# Patient Record
Sex: FEMALE | Race: WHITE | NOT HISPANIC OR LATINO
[De-identification: names, ages, dates, MRNs, and addresses within clinical notes are randomized per-mention and may not be internally consistent; named-entity substitution may affect disease eponyms.]

---

## 2020-03-17 ENCOUNTER — APPOINTMENT (OUTPATIENT)
Dept: RHEUMATOLOGY | Facility: CLINIC | Age: 35
End: 2020-03-17

## 2020-03-19 PROBLEM — Z00.00 ENCOUNTER FOR PREVENTIVE HEALTH EXAMINATION: Status: ACTIVE | Noted: 2020-03-19

## 2020-04-10 ENCOUNTER — RX RENEWAL (OUTPATIENT)
Age: 35
End: 2020-04-10

## 2020-04-28 ENCOUNTER — APPOINTMENT (OUTPATIENT)
Dept: RHEUMATOLOGY | Facility: CLINIC | Age: 35
End: 2020-04-28
Payer: COMMERCIAL

## 2020-04-28 DIAGNOSIS — M25.50 PAIN IN UNSPECIFIED JOINT: ICD-10-CM

## 2020-04-28 DIAGNOSIS — L60.8 OTHER NAIL DISORDERS: ICD-10-CM

## 2020-04-28 DIAGNOSIS — Z78.9 OTHER SPECIFIED HEALTH STATUS: ICD-10-CM

## 2020-04-28 PROCEDURE — 99213 OFFICE O/P EST LOW 20 MIN: CPT | Mod: 95

## 2020-04-29 PROBLEM — M25.50 ARTHRALGIA: Status: ACTIVE | Noted: 2020-04-29

## 2020-04-29 PROBLEM — L60.8 PITTING OF NAILS: Status: ACTIVE | Noted: 2020-04-29

## 2020-04-29 PROBLEM — Z78.9 NON-SMOKER: Status: ACTIVE | Noted: 2020-04-29

## 2020-04-29 NOTE — ASSESSMENT
[FreeTextEntry1] : 35 year old woman with history of psoriatic arthritis, doing well on enbrel 50 mg.  Patient currently taking enbrel about every three to four weeks.  Patient feeling well.  Reviewed side effects of enbrel with patient, including but not limited to increase in risk of infections including tuberculosis, malignancy, demyelinating diseases, and worsening of heart failure symptoms.  Reviewed hand washing, avoiding sick contacts, social distancing, and holding enbrel if feeling unwell.

## 2020-04-29 NOTE — DATA REVIEWED
[FreeTextEntry1] : Labs:\par Patient reviewed on her phone portal from previous office\par July 2019:\par Quantiferon negative

## 2020-04-29 NOTE — HISTORY OF PRESENT ILLNESS
[Home] : at home, [unfilled] , at the time of the visit. [Other Location: e.g. Home (Enter Location, City,State)___] : at [unfilled] [Patient] : the patient [Self] : self [FreeTextEntry1] : 35 year old woman with history of psoriatic arthritis, seen via video visit for follow up care.  Patient was previously seen by me, now returns to establish care.  patient feeling well.  \par Takes enbrel 50 mg every 3-4 weeks.  Has been taking enbrel for about fie years, although patient does not recall exact start date,  No side effects noted.\par No joint pain at this time.  Patient with history of knee pain and swelling in past.\par No active rashes at this time.  \par +nail pitting present.\par No recent infections. No fevers, no chills.  No change in taste and smell.\par \par Patient has been taking enbrel for about five years.  No other biologics in past.\par Discussed with patient.  You have chosen to receive care through the use of tele-media.  Telemedia enables health care providers at different locations to provide safe, effective, and convenient care through the use of technology.  Please note this is a billable encounter.  Patient understands that I cannot perform a physical examine and that patient may need to come to the clinic to complete the assessment.  Patient agreed verbally to to understanding the risks of benefits of telemedia as explained.

## 2020-04-29 NOTE — PHYSICAL EXAM
[General Appearance - Alert] : alert [General Appearance - In No Acute Distress] : in no acute distress [] : no respiratory distress [Respiration, Rhythm And Depth] : normal respiratory rhythm and effort [Exaggerated Use Of Accessory Muscles For Inspiration] : no accessory muscle use [Musculoskeletal - Swelling] : no joint swelling seen [Oriented To Time, Place, And Person] : oriented to person, place, and time [Affect] : the affect was normal [Impaired Insight] : insight and judgment were intact [Mood] : the mood was normal [FreeTextEntry1] : Limited due to video visit.

## 2020-08-17 ENCOUNTER — APPOINTMENT (OUTPATIENT)
Dept: RHEUMATOLOGY | Facility: CLINIC | Age: 35
End: 2020-08-17
Payer: COMMERCIAL

## 2020-08-17 PROCEDURE — 36415 COLL VENOUS BLD VENIPUNCTURE: CPT

## 2020-08-18 LAB
ALBUMIN SERPL ELPH-MCNC: 5.2 G/DL
ALP BLD-CCNC: 47 U/L
ALT SERPL-CCNC: 11 U/L
ANION GAP SERPL CALC-SCNC: 11 MMOL/L
AST SERPL-CCNC: 18 U/L
BASOPHILS # BLD AUTO: 0.03 K/UL
BASOPHILS NFR BLD AUTO: 0.5 %
BILIRUB SERPL-MCNC: 0.8 MG/DL
BUN SERPL-MCNC: 14 MG/DL
CALCIUM SERPL-MCNC: 9.6 MG/DL
CHLORIDE SERPL-SCNC: 103 MMOL/L
CO2 SERPL-SCNC: 22 MMOL/L
CREAT SERPL-MCNC: 0.59 MG/DL
CRP SERPL-MCNC: <0.1 MG/DL
EOSINOPHIL # BLD AUTO: 0.03 K/UL
EOSINOPHIL NFR BLD AUTO: 0.5 %
ERYTHROCYTE [SEDIMENTATION RATE] IN BLOOD BY WESTERGREN METHOD: 18 MM/HR
GLUCOSE SERPL-MCNC: 82 MG/DL
HCT VFR BLD CALC: 40.4 %
HGB BLD-MCNC: 13.2 G/DL
IMM GRANULOCYTES NFR BLD AUTO: 0.2 %
LYMPHOCYTES # BLD AUTO: 1.11 K/UL
LYMPHOCYTES NFR BLD AUTO: 18.3 %
MAN DIFF?: NORMAL
MCHC RBC-ENTMCNC: 31.1 PG
MCHC RBC-ENTMCNC: 32.7 GM/DL
MCV RBC AUTO: 95.1 FL
MONOCYTES # BLD AUTO: 0.63 K/UL
MONOCYTES NFR BLD AUTO: 10.4 %
NEUTROPHILS # BLD AUTO: 4.27 K/UL
NEUTROPHILS NFR BLD AUTO: 70.1 %
PLATELET # BLD AUTO: 329 K/UL
POTASSIUM SERPL-SCNC: 4.5 MMOL/L
PROT SERPL-MCNC: 7.2 G/DL
RBC # BLD: 4.25 M/UL
RBC # FLD: 12.9 %
SODIUM SERPL-SCNC: 137 MMOL/L
WBC # FLD AUTO: 6.08 K/UL

## 2020-08-20 ENCOUNTER — NON-APPOINTMENT (OUTPATIENT)
Age: 35
End: 2020-08-20

## 2020-08-20 LAB
M TB IFN-G BLD-IMP: NEGATIVE
QUANTIFERON TB PLUS MITOGEN MINUS NIL: 7.74 IU/ML
QUANTIFERON TB PLUS NIL: 0.01 IU/ML
QUANTIFERON TB PLUS TB1 MINUS NIL: 0 IU/ML
QUANTIFERON TB PLUS TB2 MINUS NIL: 0 IU/ML

## 2021-01-09 ENCOUNTER — RX RENEWAL (OUTPATIENT)
Age: 36
End: 2021-01-09

## 2021-12-21 ENCOUNTER — APPOINTMENT (OUTPATIENT)
Dept: RHEUMATOLOGY | Facility: CLINIC | Age: 36
End: 2021-12-21
Payer: COMMERCIAL

## 2021-12-21 PROCEDURE — 99213 OFFICE O/P EST LOW 20 MIN: CPT | Mod: 95

## 2021-12-21 NOTE — ASSESSMENT
[FreeTextEntry1] : 36 year old woman with history of psoriatic arthritis, doing well on enbrel 50 mg.  Patient currently taking enbrel about every four weeks, last dose 11/29/2021. Patient with recent COVID positive with cold like symptoms, now starting to feel better.  Would continue to hold enbrel until feeling better, back to baseline. Again reviewed hand washing, avoiding sick contacts, social distancing, and holding enbrel if feeling unwell.  Will place routine lab test orders and patient will complete next month when acute infection resolves.  Patient will seek emergency care if condition changes or worsens.

## 2021-12-21 NOTE — REVIEW OF SYSTEMS
[Feeling Poorly] : feeling poorly [Feeling Tired] : feeling tired [Nasal Discharge] : nasal discharge [Negative] : Heme/Lymph [Shortness Of Breath] : no shortness of breath

## 2021-12-21 NOTE — HISTORY OF PRESENT ILLNESS
[Home] : at home, [unfilled] , at the time of the visit. [Other Location: e.g. Home (Enter Location, City,State)___] : at [unfilled] [Patient] : the patient [Self] : self [FreeTextEntry1] : December 21, 2021\par Discussed with patient.  You have chosen to receive care through the use of tele-media.  Telemedia enables health care providers at different locations to provide safe, effective, and convenient care through the use of technology.  Please note this is a billable encounter.  Patient understands that I cannot perform a physical exam and that patient may need to come to the clinic to complete the assessment.  Patient agreed verbally to understanding the risks of benefits of telemedia as explained.\par \par Patient returns for follow up of psoriatic arthritis.\par Patient with recent covid positive, starting to feel better.  No fever, feels cold like symptoms, started as sinus congestion.\par Initially with cold like symptoms, following travelling, tested negative last week with rapid and PCR.  [employer]ers also with cold like symptoms and tested positive this week as well.\par Last enbrel dose 11/29/2021\par Takes enbrel 50 mg about every 4 weeks. Has been taking enbrel for about six years, although patient does not recall exact start date.  No side effects noted.\par No joint pain at this time. Patient with history of knee pain and swelling in past.\par No active rashes at this time.  \par  Previous nail pitting present.\par No other biologics in past.

## 2023-02-06 ENCOUNTER — LABORATORY RESULT (OUTPATIENT)
Age: 38
End: 2023-02-06

## 2023-02-06 ENCOUNTER — APPOINTMENT (OUTPATIENT)
Dept: RHEUMATOLOGY | Facility: CLINIC | Age: 38
End: 2023-02-06
Payer: COMMERCIAL

## 2023-02-06 VITALS
HEART RATE: 73 BPM | DIASTOLIC BLOOD PRESSURE: 77 MMHG | OXYGEN SATURATION: 97 % | WEIGHT: 150 LBS | BODY MASS INDEX: 24.99 KG/M2 | TEMPERATURE: 97.1 F | SYSTOLIC BLOOD PRESSURE: 128 MMHG | HEIGHT: 65 IN

## 2023-02-06 DIAGNOSIS — M25.469 EFFUSION, UNSPECIFIED KNEE: ICD-10-CM

## 2023-02-06 DIAGNOSIS — M06.4 INFLAMMATORY POLYARTHROPATHY: ICD-10-CM

## 2023-02-06 PROCEDURE — 99214 OFFICE O/P EST MOD 30 MIN: CPT | Mod: 25

## 2023-02-06 PROCEDURE — 36415 COLL VENOUS BLD VENIPUNCTURE: CPT

## 2023-02-10 LAB
ALBUMIN SERPL ELPH-MCNC: 4.8 G/DL
ALP BLD-CCNC: 52 U/L
ALT SERPL-CCNC: 15 U/L
ANION GAP SERPL CALC-SCNC: 12 MMOL/L
AST SERPL-CCNC: 15 U/L
BASOPHILS # BLD AUTO: 0.03 K/UL
BASOPHILS NFR BLD AUTO: 0.5 %
BILIRUB SERPL-MCNC: 0.6 MG/DL
BUN SERPL-MCNC: 18 MG/DL
CALCIUM SERPL-MCNC: 9.9 MG/DL
CCP AB SER IA-ACNC: <8 UNITS
CHLORIDE SERPL-SCNC: 101 MMOL/L
CO2 SERPL-SCNC: 26 MMOL/L
CREAT SERPL-MCNC: 0.67 MG/DL
CRP SERPL-MCNC: <3 MG/L
EGFR: 115 ML/MIN/1.73M2
EOSINOPHIL # BLD AUTO: 0.05 K/UL
EOSINOPHIL NFR BLD AUTO: 0.8 %
ERYTHROCYTE [SEDIMENTATION RATE] IN BLOOD BY WESTERGREN METHOD: 7 MM/HR
GLUCOSE SERPL-MCNC: 89 MG/DL
HCT VFR BLD CALC: 38.1 %
HGB BLD-MCNC: 12.3 G/DL
IMM GRANULOCYTES NFR BLD AUTO: 0.3 %
LYMPHOCYTES # BLD AUTO: 1.28 K/UL
LYMPHOCYTES NFR BLD AUTO: 20.2 %
M TB IFN-G BLD-IMP: NEGATIVE
MAN DIFF?: NORMAL
MCHC RBC-ENTMCNC: 30.6 PG
MCHC RBC-ENTMCNC: 32.3 GM/DL
MCV RBC AUTO: 94.8 FL
MONOCYTES # BLD AUTO: 0.59 K/UL
MONOCYTES NFR BLD AUTO: 9.3 %
NEUTROPHILS # BLD AUTO: 4.38 K/UL
NEUTROPHILS NFR BLD AUTO: 68.9 %
PLATELET # BLD AUTO: 333 K/UL
POTASSIUM SERPL-SCNC: 4.1 MMOL/L
PROT SERPL-MCNC: 7.4 G/DL
QUANTIFERON TB PLUS MITOGEN MINUS NIL: 6.29 IU/ML
QUANTIFERON TB PLUS NIL: 0.02 IU/ML
QUANTIFERON TB PLUS TB1 MINUS NIL: -0.01 IU/ML
QUANTIFERON TB PLUS TB2 MINUS NIL: -0.01 IU/ML
RBC # BLD: 4.02 M/UL
RBC # FLD: 13.2 %
RF+CCP IGG SER-IMP: NEGATIVE
RHEUMATOID FACT SER QL: <10 IU/ML
SODIUM SERPL-SCNC: 139 MMOL/L
WBC # FLD AUTO: 6.35 K/UL

## 2023-02-12 NOTE — ASSESSMENT
[FreeTextEntry1] : 37 year old woman with history of psoriatic arthritis, doing well on enbrel 50 mg. Patient with some increased in right knee pain and stiffness, currently taking enbrel every four weeks, will increase to enbrel weekly for one month and then reevaluate knee symptoms.  Will update labs today in office as well.  Further management pending lab results.

## 2023-02-12 NOTE — PHYSICAL EXAM
[General Appearance - Alert] : alert [General Appearance - In No Acute Distress] : in no acute distress [General Appearance - Well-Appearing] : healthy appearing [Sclera] : the sclera and conjunctiva were normal [Examination Of The Oral Cavity] : the lips and gums were normal [Respiration, Rhythm And Depth] : normal respiratory rhythm and effort [Exaggerated Use Of Accessory Muscles For Inspiration] : no accessory muscle use [Edema] : there was no peripheral edema [Abnormal Walk] : normal gait [] : no rash [Skin Lesions] : no skin lesions [Oriented To Time, Place, And Person] : oriented to person, place, and time [Impaired Insight] : insight and judgment were intact [Affect] : the affect was normal [Mood] : the mood was normal [FreeTextEntry1] : minimal right knee effusion without tenderness, warmth, or limitation in range of motion.

## 2023-02-12 NOTE — HISTORY OF PRESENT ILLNESS
[FreeTextEntry1] : February 6, 2023\par Patient returns for follow up \par Patient feeling well \par Right knee pain and swelling, usually left knee is the symptomatic\par Doesn't bend as much as previously\par Exercising, cycling, doesn't hurt\par Just feels swollen\par Overall feels stiff in the morning, not more so than usual\par Dry patch on the back of the right leg with some pruritus\par Last enbrel yesterday, last one about three weeks, January 17\par Will update labs today\par No recent blood tests \par No nsaids\par No acetaminophen\par left knee feels ok\par \par December 21, 2021\par Discussed with patient.  You have chosen to receive care through the use of tele-media.  Telemedia enables health care providers at different locations to provide safe, effective, and convenient care through the use of technology.  Please note this is a billable encounter.  Patient understands that I cannot perform a physical exam and that patient may need to come to the clinic to complete the assessment.  Patient agreed verbally to understanding the risks of benefits of telemedia as explained.\par \par Patient returns for follow up of psoriatic arthritis.\par Patient with recent covid positive, starting to feel better.  No fever, feels cold like symptoms, started as sinus congestion.\par Initially with cold like symptoms, following travelling, tested negative last week with rapid and PCR.  [employer]ers also with cold like symptoms and tested positive this week as well.\par Last enbrel dose 11/29/2021\par Takes enbrel 50 mg about every 4 weeks. Has been taking enbrel for about six years, although patient does not recall exact start date.  No side effects noted.\par No joint pain at this time. Patient with history of knee pain and swelling in past.\par No active rashes at this time.  \par  Previous nail pitting present.\par No other biologics in past.

## 2023-02-21 ENCOUNTER — TRANSCRIPTION ENCOUNTER (OUTPATIENT)
Age: 38
End: 2023-02-21

## 2023-09-05 ENCOUNTER — RX RENEWAL (OUTPATIENT)
Age: 38
End: 2023-09-05

## 2023-09-26 ENCOUNTER — RX RENEWAL (OUTPATIENT)
Age: 38
End: 2023-09-26

## 2023-10-11 ENCOUNTER — APPOINTMENT (OUTPATIENT)
Dept: ORTHOPEDIC SURGERY | Facility: CLINIC | Age: 38
End: 2023-10-11

## 2023-10-17 ENCOUNTER — RX RENEWAL (OUTPATIENT)
Age: 38
End: 2023-10-17

## 2024-02-05 RX ORDER — ETANERCEPT 50 MG/ML
50 SOLUTION SUBCUTANEOUS
Qty: 4 | Refills: 0 | Status: ACTIVE | COMMUNITY
Start: 2020-03-19

## 2024-03-28 ENCOUNTER — NON-APPOINTMENT (OUTPATIENT)
Age: 39
End: 2024-03-28

## 2024-03-28 RX ORDER — FLUOXETINE 20 MG/1
20 TABLET ORAL
Refills: 0 | Status: ACTIVE | COMMUNITY

## 2024-03-29 ENCOUNTER — APPOINTMENT (OUTPATIENT)
Dept: COLORECTAL SURGERY | Facility: CLINIC | Age: 39
End: 2024-03-29
Payer: COMMERCIAL

## 2024-03-29 ENCOUNTER — NON-APPOINTMENT (OUTPATIENT)
Age: 39
End: 2024-03-29

## 2024-03-29 VITALS
WEIGHT: 148 LBS | SYSTOLIC BLOOD PRESSURE: 129 MMHG | TEMPERATURE: 98.6 F | HEART RATE: 75 BPM | HEIGHT: 65 IN | DIASTOLIC BLOOD PRESSURE: 89 MMHG | BODY MASS INDEX: 24.66 KG/M2

## 2024-03-29 DIAGNOSIS — F41.9 ANXIETY DISORDER, UNSPECIFIED: ICD-10-CM

## 2024-03-29 DIAGNOSIS — D64.9 ANEMIA, UNSPECIFIED: ICD-10-CM

## 2024-03-29 DIAGNOSIS — A63.0 ANOGENITAL (VENEREAL) WARTS: ICD-10-CM

## 2024-03-29 DIAGNOSIS — Z80.3 FAMILY HISTORY OF MALIGNANT NEOPLASM OF BREAST: ICD-10-CM

## 2024-03-29 DIAGNOSIS — Z78.9 OTHER SPECIFIED HEALTH STATUS: ICD-10-CM

## 2024-03-29 DIAGNOSIS — Z80.8 FAMILY HISTORY OF MALIGNANT NEOPLASM OF OTHER ORGANS OR SYSTEMS: ICD-10-CM

## 2024-03-29 DIAGNOSIS — F32.A ANXIETY DISORDER, UNSPECIFIED: ICD-10-CM

## 2024-03-29 DIAGNOSIS — Z82.49 FAMILY HISTORY OF ISCHEMIC HEART DISEASE AND OTHER DISEASES OF THE CIRCULATORY SYSTEM: ICD-10-CM

## 2024-03-29 PROCEDURE — 99202 OFFICE O/P NEW SF 15 MIN: CPT | Mod: 25

## 2024-03-29 PROCEDURE — 46600 DIAGNOSTIC ANOSCOPY SPX: CPT

## 2024-03-29 NOTE — ASSESSMENT
[FreeTextEntry1] : Reviewed with the patient etiology of anal warts and its association with human papilloma virus. The sexually transmitted nature of this infection, as well as the need for safe sexual practices has been reviewed. The treatment options at this time including medical therapy, and surgical fulguration had been outlined discussed. The risks, benefits, and alternatives of treatment including postprocedure pain, bleeding, infection, anal stenosis, change in bowel habits, recurrent warts, as well as the risk and need for recurrent procedures were reviewed. The risk of anal dysplasia and long-term risk of malignancy have been outlined. The role surveillance has been stressed.   Recommend return in 4 months for repeat exam and anal cytology testing.

## 2024-03-29 NOTE — HISTORY OF PRESENT ILLNESS
[FreeTextEntry1] : 40 y/o F presents for initial evaluation of "Anal Warts"  Referred by Dr. Rik Holder  PMH: Anemia, Depression, Anxiety  PSH: Denies FH: Denies CRC/IBD, +Skin Cancer, +Breast Ca Meds: Fluoxetine, Enbrel  Allergies: NKDA  Completed Gardasil Vaccine series about 1 year ago Denies Colonoscopy  Patient has been seeing Derm Dr. Rik Holder for Genital Warts since 2022, mostly noted on perineum  States 2 weeks ago saw Derm who removed 2 warts around the anus and recommended patient see CRS for an internal exam.   Denies history of abnormal cervical paps.  Never had an anal pap done.   +Anal receptive sex.  Patient reports was noticing some bleeding with wiping after BM, but after Derm removed warts around anus that has subsided.   Denies ASA/NSAIDS in the last 7 days.

## 2024-03-29 NOTE — PHYSICAL EXAM
[Excoriation] : excoriations [Normal] : was normal [Wart] : no warts [None] : there was no rectal mass  [de-identified] : Mild perianal psoriasis with superficial excoriation of the anal margin and gluteal cleft.  No visible warts. [FreeTextEntry1] : Medical assistant was present for the entire exam.  Anoscopy was performed for evaluation of the patients rectal bleeding  history . The risks, benefits and alternatives were reviewed.  A lighted anoscope was passed into the anal canal and the entire anal mucosal surface was inspected..   The findings revealed moderate internal hemorrhoids. No masses or lesions were identified. 2 mm posterior anal papilla.

## 2024-04-09 ENCOUNTER — RX RENEWAL (OUTPATIENT)
Age: 39
End: 2024-04-09

## 2024-06-03 ENCOUNTER — RX RENEWAL (OUTPATIENT)
Age: 39
End: 2024-06-03

## 2024-06-03 RX ORDER — ETANERCEPT 50 MG/ML
50 SOLUTION SUBCUTANEOUS
Qty: 4 | Refills: 0 | Status: ACTIVE | COMMUNITY
Start: 2020-04-28 | End: 1900-01-01

## 2024-07-08 ENCOUNTER — APPOINTMENT (OUTPATIENT)
Dept: RHEUMATOLOGY | Facility: CLINIC | Age: 39
End: 2024-07-08
Payer: COMMERCIAL

## 2024-07-08 VITALS
OXYGEN SATURATION: 97 % | WEIGHT: 149 LBS | DIASTOLIC BLOOD PRESSURE: 71 MMHG | HEART RATE: 71 BPM | TEMPERATURE: 97.2 F | BODY MASS INDEX: 24.83 KG/M2 | SYSTOLIC BLOOD PRESSURE: 111 MMHG | HEIGHT: 65 IN

## 2024-07-08 DIAGNOSIS — M06.4 INFLAMMATORY POLYARTHROPATHY: ICD-10-CM

## 2024-07-08 DIAGNOSIS — Z11.1 ENCOUNTER FOR SCREENING FOR RESPIRATORY TUBERCULOSIS: ICD-10-CM

## 2024-07-08 DIAGNOSIS — L60.8 OTHER NAIL DISORDERS: ICD-10-CM

## 2024-07-08 PROCEDURE — 36415 COLL VENOUS BLD VENIPUNCTURE: CPT

## 2024-07-08 PROCEDURE — 99214 OFFICE O/P EST MOD 30 MIN: CPT

## 2024-07-08 PROCEDURE — G2211 COMPLEX E/M VISIT ADD ON: CPT

## 2024-07-09 LAB
ALBUMIN SERPL ELPH-MCNC: 4.6 G/DL
ALT SERPL-CCNC: 16 U/L
ANION GAP SERPL CALC-SCNC: 14 MMOL/L
AST SERPL-CCNC: 19 U/L
BASOPHILS # BLD AUTO: 0.03 K/UL
BASOPHILS NFR BLD AUTO: 0.6 %
BILIRUB SERPL-MCNC: 1 MG/DL
BUN SERPL-MCNC: 10 MG/DL
CALCIUM SERPL-MCNC: 9.5 MG/DL
CHLORIDE SERPL-SCNC: 102 MMOL/L
CO2 SERPL-SCNC: 23 MMOL/L
CREAT SERPL-MCNC: 0.63 MG/DL
CRP SERPL-MCNC: <3 MG/L
EGFR: 116 ML/MIN/1.73M2
EOSINOPHIL # BLD AUTO: 0.02 K/UL
EOSINOPHIL NFR BLD AUTO: 0.4 %
ERYTHROCYTE [SEDIMENTATION RATE] IN BLOOD BY WESTERGREN METHOD: 4 MM/HR
HCT VFR BLD CALC: 37.6 %
HGB BLD-MCNC: 12.1 G/DL
IMM GRANULOCYTES NFR BLD AUTO: 0.2 %
LYMPHOCYTES # BLD AUTO: 1.23 K/UL
LYMPHOCYTES NFR BLD AUTO: 22.7 %
MAN DIFF?: NORMAL
MCHC RBC-ENTMCNC: 32.2 GM/DL
MCV RBC AUTO: 98.2 FL
MONOCYTES # BLD AUTO: 0.48 K/UL
MONOCYTES NFR BLD AUTO: 8.9 %
NEUTROPHILS # BLD AUTO: 3.64 K/UL
NEUTROPHILS NFR BLD AUTO: 67.2 %
PLATELET # BLD AUTO: 349 K/UL
POTASSIUM SERPL-SCNC: 4 MMOL/L
PROT SERPL-MCNC: 7.4 G/DL
RBC # FLD: 13.2 %
SODIUM SERPL-SCNC: 139 MMOL/L
WBC # FLD AUTO: 5.41 K/UL

## 2024-07-10 LAB
M TB IFN-G BLD-IMP: NEGATIVE
QUANTIFERON TB PLUS MITOGEN MINUS NIL: >10 IU/ML
QUANTIFERON TB PLUS NIL: 0.03 IU/ML
QUANTIFERON TB PLUS TB1 MINUS NIL: 0 IU/ML
QUANTIFERON TB PLUS TB2 MINUS NIL: 0 IU/ML

## 2024-07-28 ENCOUNTER — NON-APPOINTMENT (OUTPATIENT)
Age: 39
End: 2024-07-28

## 2024-07-29 ENCOUNTER — APPOINTMENT (OUTPATIENT)
Dept: COLORECTAL SURGERY | Facility: CLINIC | Age: 39
End: 2024-07-29

## 2024-07-29 ENCOUNTER — LABORATORY RESULT (OUTPATIENT)
Age: 39
End: 2024-07-29

## 2024-07-29 VITALS
TEMPERATURE: 97.8 F | BODY MASS INDEX: 25.66 KG/M2 | SYSTOLIC BLOOD PRESSURE: 129 MMHG | WEIGHT: 154 LBS | HEART RATE: 67 BPM | HEIGHT: 65 IN | DIASTOLIC BLOOD PRESSURE: 81 MMHG

## 2024-07-29 DIAGNOSIS — A63.0 ANOGENITAL (VENEREAL) WARTS: ICD-10-CM

## 2024-07-29 PROCEDURE — 46910 DESTRUCTION ANAL LESION(S): CPT

## 2024-07-29 PROCEDURE — 99213 OFFICE O/P EST LOW 20 MIN: CPT | Mod: 25

## 2024-07-29 NOTE — PHYSICAL EXAM
[Excoriation] : excoriations [Wart] : no warts [Normal] : was normal [None] : there was no rectal mass  [de-identified] : Mild perianal psoriasis with superficial excoriation of the anal margin and gluteal cleft.  Right posterior lateral anal verge wart.  Fulgurated. [FreeTextEntry1] : Medical assistant was present for the entire exam.  Anoscopy was performed for evaluation of the patients rectal bleeding  history . The risks, benefits and alternatives were reviewed.  A lighted anoscope was passed into the anal canal and the entire anal mucosal surface was inspected..   The findings revealed moderate internal hemorrhoids. No masses or lesions were identified. 2 mm posterior anal papilla.

## 2024-07-29 NOTE — PHYSICAL EXAM
[Excoriation] : excoriations [Wart] : no warts [Normal] : was normal [None] : there was no rectal mass  [de-identified] : Mild perianal psoriasis with superficial excoriation of the anal margin and gluteal cleft.  Right posterior lateral anal verge wart.  Fulgurated. [FreeTextEntry1] : Medical assistant was present for the entire exam.  Anoscopy was performed for evaluation of the patients rectal bleeding  history . The risks, benefits and alternatives were reviewed.  A lighted anoscope was passed into the anal canal and the entire anal mucosal surface was inspected..   The findings revealed moderate internal hemorrhoids. No masses or lesions were identified. 2 mm posterior anal papilla.

## 2024-07-29 NOTE — HISTORY OF PRESENT ILLNESS
[FreeTextEntry1] : 38 y/o F presents for follow up.   Initially referred by Dr. Rik Holder  PMH: Anemia, Depression, Anxiety PSH: Denies FH: Denies CRC/IBD, +Skin Cancer, +Breast Ca Meds: Fluoxetine, Enbrel Allergies: NKDA Completed Gardasil Vaccine series about 1 year ago Denies Colonoscopy  Initially seen 3/29/24. Patient has been seeing Derm Dr. Rik Holder for Genital Warts since 2022, mostly noted on perineum. States 2 weeks ago saw Derm who removed 2 warts around the anus and recommended patient see CRS for an internal exam. +Anal receptive sex. Anoscopy findings revealed moderate internal hemorrhoids. No masses or lesions were identified. 2 mm posterior anal papilla. Patient recommend return in 4 months for repeat exam and anal cytology testing.

## 2024-07-29 NOTE — HISTORY OF PRESENT ILLNESS
[FreeTextEntry1] : 40 y/o F presents for follow up.   Initially referred by Dr. Rik Holder  PMH: Anemia, Depression, Anxiety PSH: Denies FH: Denies CRC/IBD, +Skin Cancer, +Breast Ca Meds: Fluoxetine, Enbrel Allergies: NKDA Completed Gardasil Vaccine series about 1 year ago Denies Colonoscopy  Initially seen 3/29/24. Patient has been seeing Derm Dr. Rik Holder for Genital Warts since 2022, mostly noted on perineum. States 2 weeks ago saw Derm who removed 2 warts around the anus and recommended patient see CRS for an internal exam. +Anal receptive sex. Anoscopy findings revealed moderate internal hemorrhoids. No masses or lesions were identified. 2 mm posterior anal papilla. Patient recommend return in 4 months for repeat exam and anal cytology testing.

## 2024-08-09 ENCOUNTER — NON-APPOINTMENT (OUTPATIENT)
Age: 39
End: 2024-08-09

## 2024-08-09 LAB — ANAL PAP CYTOLOGY: NORMAL

## 2024-11-25 ENCOUNTER — APPOINTMENT (OUTPATIENT)
Dept: COLORECTAL SURGERY | Facility: CLINIC | Age: 39
End: 2024-11-25

## 2024-11-25 VITALS
WEIGHT: 150 LBS | DIASTOLIC BLOOD PRESSURE: 71 MMHG | BODY MASS INDEX: 24.99 KG/M2 | HEART RATE: 80 BPM | TEMPERATURE: 97.9 F | SYSTOLIC BLOOD PRESSURE: 105 MMHG | HEIGHT: 65 IN

## 2024-11-25 DIAGNOSIS — Z11.51 ENCOUNTER FOR SCREENING FOR HUMAN PAPILLOMAVIRUS (HPV): ICD-10-CM

## 2024-11-25 DIAGNOSIS — A63.0 ANOGENITAL (VENEREAL) WARTS: ICD-10-CM

## 2024-11-25 PROCEDURE — 46600 DIAGNOSTIC ANOSCOPY SPX: CPT

## 2024-11-25 PROCEDURE — 99213 OFFICE O/P EST LOW 20 MIN: CPT | Mod: 25

## 2024-11-27 ENCOUNTER — NON-APPOINTMENT (OUTPATIENT)
Age: 39
End: 2024-11-27

## 2024-11-27 LAB — ANAL PAP CYTOLOGY: NORMAL

## 2025-01-14 ENCOUNTER — APPOINTMENT (OUTPATIENT)
Dept: RHEUMATOLOGY | Facility: CLINIC | Age: 40
End: 2025-01-14
Payer: COMMERCIAL

## 2025-01-14 VITALS
WEIGHT: 150 LBS | TEMPERATURE: 97.3 F | DIASTOLIC BLOOD PRESSURE: 78 MMHG | OXYGEN SATURATION: 98 % | SYSTOLIC BLOOD PRESSURE: 113 MMHG | HEART RATE: 69 BPM | BODY MASS INDEX: 24.99 KG/M2 | HEIGHT: 65 IN

## 2025-01-14 DIAGNOSIS — M06.4 INFLAMMATORY POLYARTHROPATHY: ICD-10-CM

## 2025-01-14 DIAGNOSIS — F41.9 ANXIETY DISORDER, UNSPECIFIED: ICD-10-CM

## 2025-01-14 DIAGNOSIS — L60.8 OTHER NAIL DISORDERS: ICD-10-CM

## 2025-01-14 DIAGNOSIS — F32.A ANXIETY DISORDER, UNSPECIFIED: ICD-10-CM

## 2025-01-14 PROCEDURE — 99214 OFFICE O/P EST MOD 30 MIN: CPT

## 2025-01-14 PROCEDURE — G2211 COMPLEX E/M VISIT ADD ON: CPT | Mod: NC

## 2025-06-03 ENCOUNTER — NON-APPOINTMENT (OUTPATIENT)
Age: 40
End: 2025-06-03

## 2025-06-04 ENCOUNTER — APPOINTMENT (OUTPATIENT)
Dept: RHEUMATOLOGY | Facility: CLINIC | Age: 40
End: 2025-06-04
Payer: COMMERCIAL

## 2025-06-04 VITALS
HEIGHT: 65 IN | OXYGEN SATURATION: 97 % | SYSTOLIC BLOOD PRESSURE: 118 MMHG | DIASTOLIC BLOOD PRESSURE: 75 MMHG | BODY MASS INDEX: 25.16 KG/M2 | WEIGHT: 151 LBS | HEART RATE: 75 BPM | TEMPERATURE: 96.6 F

## 2025-06-04 DIAGNOSIS — L40.50 ARTHROPATHIC PSORIASIS, UNSPECIFIED: ICD-10-CM

## 2025-06-04 DIAGNOSIS — M06.4 INFLAMMATORY POLYARTHROPATHY: ICD-10-CM

## 2025-06-04 DIAGNOSIS — Z11.1 ENCOUNTER FOR SCREENING FOR RESPIRATORY TUBERCULOSIS: ICD-10-CM

## 2025-06-04 DIAGNOSIS — Z86.19 PERSONAL HISTORY OF OTHER INFECTIOUS AND PARASITIC DISEASES: ICD-10-CM

## 2025-06-04 DIAGNOSIS — A63.0 ANOGENITAL (VENEREAL) WARTS: ICD-10-CM

## 2025-06-04 DIAGNOSIS — Z11.59 ENCOUNTER FOR SCREENING FOR OTHER VIRAL DISEASES: ICD-10-CM

## 2025-06-04 DIAGNOSIS — L60.8 OTHER NAIL DISORDERS: ICD-10-CM

## 2025-06-04 PROCEDURE — G2211 COMPLEX E/M VISIT ADD ON: CPT | Mod: NC

## 2025-06-04 PROCEDURE — 36415 COLL VENOUS BLD VENIPUNCTURE: CPT

## 2025-06-04 PROCEDURE — 99214 OFFICE O/P EST MOD 30 MIN: CPT

## 2025-06-05 LAB
ALBUMIN SERPL ELPH-MCNC: 4.3 G/DL
ALP BLD-CCNC: 53 U/L
ALT SERPL-CCNC: 17 U/L
ANION GAP SERPL CALC-SCNC: 16 MMOL/L
AST SERPL-CCNC: 19 U/L
BASOPHILS # BLD AUTO: 0.03 K/UL
BASOPHILS NFR BLD AUTO: 0.6 %
BILIRUB SERPL-MCNC: 0.6 MG/DL
BUN SERPL-MCNC: 13 MG/DL
CALCIUM SERPL-MCNC: 9 MG/DL
CHLORIDE SERPL-SCNC: 104 MMOL/L
CO2 SERPL-SCNC: 22 MMOL/L
CREAT SERPL-MCNC: 0.58 MG/DL
CRP SERPL-MCNC: <3 MG/L
EGFRCR SERPLBLD CKD-EPI 2021: 117 ML/MIN/1.73M2
EOSINOPHIL # BLD AUTO: 0.03 K/UL
EOSINOPHIL NFR BLD AUTO: 0.6 %
ERYTHROCYTE [SEDIMENTATION RATE] IN BLOOD BY WESTERGREN METHOD: 4 MM/HR
GLUCOSE SERPL-MCNC: 76 MG/DL
HBV CORE IGG+IGM SER QL: NONREACTIVE
HBV SURFACE AB SER QL: REACTIVE
HBV SURFACE AG SER QL: NONREACTIVE
HCT VFR BLD CALC: 38.4 %
HGB BLD-MCNC: 12.3 G/DL
IMM GRANULOCYTES NFR BLD AUTO: 0.2 %
LYMPHOCYTES # BLD AUTO: 0.9 K/UL
LYMPHOCYTES NFR BLD AUTO: 19 %
MAN DIFF?: NORMAL
MCHC RBC-ENTMCNC: 30.8 PG
MCHC RBC-ENTMCNC: 32 G/DL
MCV RBC AUTO: 96 FL
MONOCYTES # BLD AUTO: 0.37 K/UL
MONOCYTES NFR BLD AUTO: 7.8 %
NEUTROPHILS # BLD AUTO: 3.39 K/UL
NEUTROPHILS NFR BLD AUTO: 71.8 %
PLATELET # BLD AUTO: 343 K/UL
POTASSIUM SERPL-SCNC: 4.4 MMOL/L
PROT SERPL-MCNC: 7 G/DL
RBC # BLD: 4 M/UL
RBC # FLD: 13.3 %
SODIUM SERPL-SCNC: 141 MMOL/L
WBC # FLD AUTO: 4.73 K/UL

## 2025-06-08 LAB
M TB IFN-G BLD-IMP: NEGATIVE
QUANTIFERON TB PLUS MITOGEN MINUS NIL: 3.25 IU/ML
QUANTIFERON TB PLUS NIL: 0.02 IU/ML
QUANTIFERON TB PLUS TB1 MINUS NIL: 0 IU/ML
QUANTIFERON TB PLUS TB2 MINUS NIL: 0 IU/ML

## 2025-06-12 ENCOUNTER — TRANSCRIPTION ENCOUNTER (OUTPATIENT)
Age: 40
End: 2025-06-12